# Patient Record
Sex: FEMALE | Race: WHITE
[De-identification: names, ages, dates, MRNs, and addresses within clinical notes are randomized per-mention and may not be internally consistent; named-entity substitution may affect disease eponyms.]

---

## 2019-09-08 ENCOUNTER — HOSPITAL ENCOUNTER (EMERGENCY)
Dept: HOSPITAL 92 - SCSER | Age: 69
Discharge: HOME | End: 2019-09-08
Payer: MEDICARE

## 2019-09-08 DIAGNOSIS — M54.6: ICD-10-CM

## 2019-09-08 DIAGNOSIS — E11.9: ICD-10-CM

## 2019-09-08 DIAGNOSIS — S01.511A: Primary | ICD-10-CM

## 2019-09-08 DIAGNOSIS — J44.9: ICD-10-CM

## 2019-09-08 DIAGNOSIS — E78.5: ICD-10-CM

## 2019-09-08 DIAGNOSIS — S00.83XA: ICD-10-CM

## 2019-09-08 DIAGNOSIS — I10: ICD-10-CM

## 2019-09-08 DIAGNOSIS — M54.5: ICD-10-CM
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
## 2019-09-08 LAB
ALBUMIN SERPL BCG-MCNC: 4 G/DL (ref 3.4–4.8)
ALP SERPL-CCNC: 68 U/L (ref 40–150)
ALT SERPL W P-5'-P-CCNC: 16 U/L (ref 8–55)
ANION GAP SERPL CALC-SCNC: 15 MMOL/L (ref 10–20)
AST SERPL-CCNC: 16 U/L (ref 5–34)
BACTERIA UR QL AUTO: (no result) HPF
BASOPHILS # BLD AUTO: 0.2 THOU/UL (ref 0–0.2)
BASOPHILS NFR BLD AUTO: 1.4 % (ref 0–1)
BILIRUB SERPL-MCNC: 0.2 MG/DL (ref 0.2–1.2)
BUN SERPL-MCNC: 24 MG/DL (ref 9.8–20.1)
CALCIUM SERPL-MCNC: 9.9 MG/DL (ref 7.8–10.44)
CHLORIDE SERPL-SCNC: 99 MMOL/L (ref 98–107)
CO2 SERPL-SCNC: 30 MMOL/L (ref 23–31)
CREAT CL PREDICTED SERPL C-G-VRATE: 0 ML/MIN (ref 70–130)
EOSINOPHIL # BLD AUTO: 0.5 THOU/UL (ref 0–0.7)
EOSINOPHIL NFR BLD AUTO: 4.6 % (ref 0–10)
GLOBULIN SER CALC-MCNC: 2.9 G/DL (ref 2.4–3.5)
GLUCOSE SERPL-MCNC: 159 MG/DL (ref 80–115)
HGB BLD-MCNC: 14.2 G/DL (ref 12–16)
LIPASE SERPL-CCNC: 25 U/L (ref 8–78)
LYMPHOCYTES # BLD: 2.2 THOU/UL (ref 1.2–3.4)
LYMPHOCYTES NFR BLD AUTO: 19.3 % (ref 21–51)
MCH RBC QN AUTO: 30 PG (ref 27–31)
MCV RBC AUTO: 87.9 FL (ref 78–98)
MONOCYTES # BLD AUTO: 0.8 THOU/UL (ref 0.11–0.59)
MONOCYTES NFR BLD AUTO: 7.1 % (ref 0–10)
NEUTROPHILS # BLD AUTO: 7.6 THOU/UL (ref 1.4–6.5)
NEUTROPHILS NFR BLD AUTO: 67.6 % (ref 42–75)
PLATELET # BLD AUTO: 272 THOU/UL (ref 130–400)
POTASSIUM SERPL-SCNC: 3.7 MMOL/L (ref 3.5–5.1)
PROT UR STRIP.AUTO-MCNC: 100 MG/DL
RBC # BLD AUTO: 4.75 MILL/UL (ref 4.2–5.4)
RBC UR QL AUTO: (no result) HPF (ref 0–3)
SODIUM SERPL-SCNC: 140 MMOL/L (ref 136–145)
WBC # BLD AUTO: 11.3 THOU/UL (ref 4.8–10.8)
WBC UR QL AUTO: (no result) HPF (ref 0–3)

## 2019-09-08 PROCEDURE — 90715 TDAP VACCINE 7 YRS/> IM: CPT

## 2019-09-08 PROCEDURE — 71045 X-RAY EXAM CHEST 1 VIEW: CPT

## 2019-09-08 PROCEDURE — 86900 BLOOD TYPING SEROLOGIC ABO: CPT

## 2019-09-08 PROCEDURE — 86901 BLOOD TYPING SEROLOGIC RH(D): CPT

## 2019-09-08 PROCEDURE — 81003 URINALYSIS AUTO W/O SCOPE: CPT

## 2019-09-08 PROCEDURE — 72170 X-RAY EXAM OF PELVIS: CPT

## 2019-09-08 PROCEDURE — 81015 MICROSCOPIC EXAM OF URINE: CPT

## 2019-09-08 PROCEDURE — 96361 HYDRATE IV INFUSION ADD-ON: CPT

## 2019-09-08 PROCEDURE — 70450 CT HEAD/BRAIN W/O DYE: CPT

## 2019-09-08 PROCEDURE — 72125 CT NECK SPINE W/O DYE: CPT

## 2019-09-08 PROCEDURE — 70486 CT MAXILLOFACIAL W/O DYE: CPT

## 2019-09-08 PROCEDURE — 96366 THER/PROPH/DIAG IV INF ADDON: CPT

## 2019-09-08 PROCEDURE — 85025 COMPLETE CBC W/AUTO DIFF WBC: CPT

## 2019-09-08 PROCEDURE — 72128 CT CHEST SPINE W/O DYE: CPT

## 2019-09-08 PROCEDURE — 90471 IMMUNIZATION ADMIN: CPT

## 2019-09-08 PROCEDURE — 83690 ASSAY OF LIPASE: CPT

## 2019-09-08 PROCEDURE — 96375 TX/PRO/DX INJ NEW DRUG ADDON: CPT

## 2019-09-08 PROCEDURE — 84484 ASSAY OF TROPONIN QUANT: CPT

## 2019-09-08 PROCEDURE — 93005 ELECTROCARDIOGRAM TRACING: CPT

## 2019-09-08 PROCEDURE — 72131 CT LUMBAR SPINE W/O DYE: CPT

## 2019-09-08 PROCEDURE — 96365 THER/PROPH/DIAG IV INF INIT: CPT

## 2019-09-08 PROCEDURE — 86850 RBC ANTIBODY SCREEN: CPT

## 2019-09-08 PROCEDURE — 80053 COMPREHEN METABOLIC PANEL: CPT

## 2019-09-08 PROCEDURE — 12011 RPR F/E/E/N/L/M 2.5 CM/<: CPT

## 2019-09-08 NOTE — CT
EXAM: CT of the thoracic spine without contrast



HISTORY: Back pain after fall while running



COMPARISON: None



TECHNIQUE: Multiple contiguous axial images were obtained in a CT of the thoracic spine without contr
ast. Sagittal and coronal reformats were performed.



FINDINGS: The vertebral bodies and intervertebral discs demonstrate normal height and alignment witho
ut fracture or subluxation. Small anterior osteophytes are seen throughout the thoracic spine. No

bony narrowing of the central canal or neural foramina is seen in the thoracic spine.



 Atherosclerotic calcifications are seen in the aorta. There is a right adrenal mass which likely rep
resents an adrenal adenoma. There is hyperplasia of the left adrenal gland.  The other prevertebral

and paraspinal soft tissues are unremarkable.



IMPRESSION: No evidence of acute osseous abnormality of the thoracic spine.



Reported By: Dagoberto Trevino 

Electronically Signed:  9/8/2019 6:58 PM

## 2019-09-08 NOTE — RAD
EXAM: 3 views of the right wrist



HISTORY: Wrist pain after fall



COMPARISON: None



FINDINGS: 3 views of the right wrist shows no evidence of acute fracture or dislocation. Mild soft ti
ssue swelling is seen. Joint space narrowing and osteophyte formation is seen in the first CMC

joint.



IMPRESSION: No evidence of acute osseous abnormality.



Reported By: Dagoberto Trevino 

Electronically Signed:  9/8/2019 7:15 PM

## 2019-09-08 NOTE — RAD
EXAM: Single view of the chest



HISTORY:   Chest pain after fall while running



COMPARISON: None



FINDINGS: Single view of the chest shows a normal sized cardiomediastinal silhouette. There is no allyssa
dence of consolidation, mass, or pleural effusion. There is a left shoulder prosthesis.

Degenerative changes are seen in the spine.



IMPRESSION: No evidence of acute cardiopulmonary disease



Reported By: Dagoberto Trevino 

Electronically Signed:  9/8/2019 7:14 PM

## 2019-09-08 NOTE — CT
EXAM: CT of the cervical spine without contrast



HISTORY: Neck pain after fall while running



COMPARISON: None



TECHNIQUE: Multiple contiguous axial images were obtained in a CT of the cervical spine without contr
ast. Sagittal and coronal reformats were performed.



FINDINGS: The vertebral bodies demonstrate normal height and alignment without fracture or subluxatio
n. There is fusion of C2 and C3. The intervertebral discs are narrowed throughout the cervical

spine with moderate to severe anterior and posterior osteophytes.  No prevertebral soft tissue swelli
ng is seen. Moderate bony narrowing of the central canal is seen in the mid and lower cervical

spine.



The posterior facets are well aligned. Normal alignment of the skull base with the cervical spine is 
seen.



The lung apices and cervical soft tissues are unremarkable.



IMPRESSION: Degenerative changes without evidence of acute osseous abnormality of the cervical spine.




Reported By: Dagoberto Trevino 

Electronically Signed:  9/8/2019 6:56 PM

## 2019-09-08 NOTE — RAD
EXAM: 3 views of the left wrist



HISTORY: Wrist pain after fall



COMPARISON: None



FINDINGS: 3 views of the left wrist shows no evidence of acute fracture or dislocation. Mild soft tis
yuli swelling is seen. Moderate joint space narrowing and osteophyte formation is seen in the first

CMC joint.



IMPRESSION: No evidence of acute osseous abnormality.



Reported By: Dagoberto Trevino 

Electronically Signed:  9/8/2019 7:16 PM

## 2019-09-08 NOTE — CT
EXAM: CT face without contrast



HISTORY: Facial trauma after fall



COMPARISON: None



TECHNIQUE: Multiple contiguous axial images were obtained and a CT of the face without contrast. Sagi
ttal and coronal reformats were performed.



FINDINGS: No facial fractures are identified. There is bilateral subluxation of the TMJs. No facial s
oft tissue swelling is seen. The globes and retrobulbar soft tissues are unremarkable.



The visualized paranasal sinuses are well aerated without evidence of opacification. The mastoid air 
cells are well aerated.



Visualized intracranial structures are unremarkable. 



IMPRESSION:

1. No evidence of facial fracture

2. Nonspecific bilateral symmetric TMJ subluxation which may be baseline normal for the patient.



Reported By: Dagoberto Trevino 

Electronically Signed:  9/8/2019 6:29 PM

## 2019-09-08 NOTE — CT
EXAM: CT of the lumbar spine without contrast



HISTORY: Low back pain after fall while running



COMPARISON: None



TECHNIQUE: Multiple contiguous axial images were obtained in a CT of the lumbar spine without contras
t. Sagittal and coronal reformats were performed.



FINDINGS: The vertebral bodies demonstrate normal height and alignment without fracture or subluxatio
n. The intervertebral discs are narrowed throughout the lumbar spine with sclerosis of the

endplates. Vacuum phenomenon is seen at multiple levels. Moderate osteophytes are seen throughout the
 lumbar spine which project both anteriorly and into the central canal. Moderate narrowing of the

bony central canal is seen from L3/4 through L5/S1. There is moderate to severe bilateral bony narrow
ing of the neural foramina from L1 2/3 through L5/S1.



 There is an oval-shaped right adrenal mass measuring 2.3 cm in greatest dimension. This has a mean H
ounsfield unit value of -16 consistent with a fat-containing adrenal adenoma. There is hyperplasia

of the left adrenal gland. Atherosclerotic calcifications are seen in the aorta..  The other preverte
bral and paraspinal soft tissues are unremarkable.



IMPRESSION: Degenerative changes of the lumbar spine without acute osseous abnormality.



Reported By: Dagoberto Trevino 

Electronically Signed:  9/8/2019 6:54 PM

## 2019-09-08 NOTE — CT
EXAM: CT brain without contrast



HISTORY: Fall with head trauma



COMPARISON: None



TECHNIQUE: Multiple contiguous axial images were obtained and a CT of the brain without contrast.



FINDINGS: There are scattered hypodensities in the subcortical and periventricular white matter consi
stent with small vessel ischemic disease. There is no evidence of hydrocephalus, intracranial

hemorrhage, or extra-axial fluid collection.



The calvarium and overlying soft tissues are unremarkable. The visualized paranasal sinuses and masto
id air cells are well aerated.



IMPRESSION: No evidence of acute intracranial abnormality



Reported By: Dagoberto Trevino 

Electronically Signed:  9/8/2019 6:14 PM

## 2019-09-08 NOTE — RAD
Exam: Single view of the pelvis



HISTORY: Pelvic pain after fall while running



COMPARISON: None



FINDINGS: A single view the pelvis shows no evidence of acute fracture or dislocation. No degenerativ
e changes seen in either hip. Degenerative changes are seen in the lumbar spine.



IMPRESSION: No evidence of acute osseous abnormality.



Reported By: Dagoberto Trevino 

Electronically Signed:  9/8/2019 7:16 PM